# Patient Record
Sex: FEMALE | Race: WHITE
[De-identification: names, ages, dates, MRNs, and addresses within clinical notes are randomized per-mention and may not be internally consistent; named-entity substitution may affect disease eponyms.]

---

## 2018-09-28 ENCOUNTER — HOSPITAL ENCOUNTER (EMERGENCY)
Dept: HOSPITAL 80 - FED | Age: 48
Discharge: HOME | End: 2018-09-28
Payer: MEDICAID

## 2018-09-28 VITALS — DIASTOLIC BLOOD PRESSURE: 77 MMHG | SYSTOLIC BLOOD PRESSURE: 125 MMHG

## 2018-09-28 DIAGNOSIS — I47.1: Primary | ICD-10-CM

## 2018-09-28 DIAGNOSIS — E86.9: ICD-10-CM

## 2018-09-28 LAB — PLATELET # BLD: 416 10^3/UL (ref 150–400)

## 2018-09-28 NOTE — CPEKG
Test Reason : OPEN

Blood Pressure : ***/*** mmHG

Vent. Rate : 091 BPM     Atrial Rate : 092 BPM

   P-R Int : 165 ms          QRS Dur : 074 ms

    QT Int : 351 ms       P-R-T Axes : 041 003 037 degrees

   QTc Int : 432 ms

 

Sinus rhythm

Low voltage, precordial leads

 

Confirmed by Philippe Olguin ([id number]) on 9/28/2018 10:43:25 PM

 

Referred By:             Confirmed By:hPilippe Olguin

## 2018-09-28 NOTE — EDPHY
H & P


Time Seen by Provider: 09/28/18 21:26


HPI/ROS: 





CHIEF COMPLAINT:  palpitations





HISTORY OF PRESENT ILLNESS:  The patient is a 48-year-old female with no 

significant past medical history who began having palpitations at work and 

feeling lightheaded.  Paramedics were called and found her to be in SVT.  They 

gave her 12 mg of adenosine and she converted to sinus rhythm.  She is 

currently asymptomatic.  No chest pain.  No shortness of breath with her 

associated symptoms.  No recent fevers or infections.  She is [place]-speaking 

only.  She denies recent caffeine or stimulant abuse.


Severity:  Severe


Modifying factors:  Resolved with adenosine





REVIEW OF SYSTEMS:


Constitutional:  denies: chills, fever, recent illness, recent injury


EENTM: denies: blurred vision, double vision, nose congestion


Respiratory: denies: cough, shortness of breath


Cardiac:  See HPI denies: chest pain, 


Gastrointestinal/Abdominal: denies: abdominal pain, diarrhea, nausea, vomiting, 

blood streaked stools


Genitourinary: denies: dysuria, frequency, hematuria, pain


Musculoskeletal: denies: joint pain, muscle pain


Skin: denies: lesions, rash, jaundice, bruising


Neurological: denies: headache, numbness, paresthesia, tingling, dizziness, 

weakness


Hematologic/Lymphatic: denies: blood clots, easy bleeding, easy bruising


Immunologic/allergic: denies: HIV/AIDS, transplant


 10 systems reviewed and negative except as noted





EXAM:


GENERAL:  Well-appearing, obese and in no acute distress.


HEAD:  Atraumatic, normocephalic.


EYES:  Pupils equal round and reactive to light, extraocular movements intact, 

sclera anicteric, conjunctiva are normal.


ENT:  TMs normal, nares patent, oropharynx clear without exudates.  Moist 

mucous membranes.


NECK:  Normal range of motion, supple without lymphadenopathy or JVD.


LUNGS:  Breath sounds clear to auscultation bilaterally and equal.  No wheezes 

rales or rhonchi.


HEART:  Regular rate and rhythm without murmurs, rubs or gallops.


ABDOMEN:  Soft, nontender, normoactive bowel sounds.  No guarding, no rebound.  

No masses appreciated. 


BACK:  No CVA tenderness, no spinal tenderness, step-offs or deformities


EXTREMITIES:  Normal range of motion, no pitting or edema.  No clubbing or 

cyanosis.


NEUROLOGICAL:  Cranial nerves II through XII grossly intact.  Normal speech, 

normal gait.  5/5 strength, normal movement in all extremities, normal sensation

, normal reflexes


PSYCH:  Normal mood, normal affect.


SKIN:  Warm, dry, normal turgor, no visible rashes or lesions.








Source: Patient, EMS


Exam Limitations: No limitations





- Medical/Surgical History


Hx Asthma: No


Hx Chronic Respiratory Disease: No


Hx Diabetes: No


Hx Cardiac Disease: No


Hx Renal Disease: No


Hx Cirrhosis: No


Hx Alcoholism: No





- Family History


Significant Family History: No pertinent family hx





- Social History


Smoking Status: Never smoked


Alcohol Use: Sober


Drug Use: None


Constitutional: 


 Initial Vital Signs











Temperature (C)  37 C   09/28/18 21:26


 


Heart Rate  102 H  09/28/18 21:26


 


Respiratory Rate  22 H  09/28/18 21:26


 


Blood Pressure  136/71 H  09/28/18 21:26


 


O2 Sat (%)  98   09/28/18 21:26








 











O2 Delivery Mode               Room Air














Allergies/Adverse Reactions: 


 





No Known Allergies Allergy (Verified 09/28/18 21:36)


 








Home Medications: 














 Medication  Instructions  Recorded


 


NK [No Known Home Meds]  09/28/18














Medical Decision Making





- Diagnostics


EKG Interpretation: 





An EKG obtained and was read and documented in trace view.  Please see trace 

view for full reading and report.  Sinus rhythm, no acute ischemic changes 


ED Course/Re-evaluation: 





Patient remains asymptomatic.  Her lab work is reassuring.  I will discharge 

her at this time to follow up with Cardiology for consideration of prophylactic 

medications.  She understands and agrees with this plan.


Differential Diagnosis: 





Partial list of the Differential diagnosis considered include but were not 

limited to;  SVT, atrial fibrillation and although unlikely based on the 

history and physical exam, I also considered acute coronary disease, infection.

  





- Data Points


Laboratory Results: 


 Laboratory Results





 09/28/18 21:26 





 09/28/18 21:26 





 











  09/28/18 09/28/18





  21:26 21:26


 


WBC    9.34 10^3/uL 10^3/uL





    (3.80-9.50) 


 


RBC    4.82 10^6/uL 10^6/uL





    (4.18-5.33) 


 


Hgb    10.7 g/dL L g/dL





    (12.6-16.3) 


 


Hct    35.1 % L %





    (38.0-47.0) 


 


MCV    72.8 fL L fL





    (81.5-99.8) 


 


MCH    22.2 pg L pg





    (27.9-34.1) 


 


MCHC    30.5 g/dL L g/dL





    (32.4-36.7) 


 


RDW    18.3 % H %





    (11.5-15.2) 


 


Plt Count    416 10^3/uL H 10^3/uL





    (150-400) 


 


MPV    11.5 fL fL





    (8.7-11.7) 


 


Neut % (Auto)    49.5 % %





    (39.3-74.2) 


 


Lymph % (Auto)    39.1 % %





    (15.0-45.0) 


 


Mono % (Auto)    7.9 % %





    (4.5-13.0) 


 


Eos % (Auto)    3.0 % %





    (0.6-7.6) 


 


Baso % (Auto)    0.3 % %





    (0.3-1.7) 


 


Nucleat RBC Rel Count    0.0 % %





    (0.0-0.2) 


 


Absolute Neuts (auto)    4.62 10^3/uL 10^3/uL





    (1.70-6.50) 


 


Absolute Lymphs (auto)    3.65 10^3/uL H 10^3/uL





    (1.00-3.00) 


 


Absolute Monos (auto)    0.74 10^3/uL 10^3/uL





    (0.30-0.80) 


 


Absolute Eos (auto)    0.28 10^3/uL 10^3/uL





    (0.03-0.40) 


 


Absolute Basos (auto)    0.03 10^3/uL 10^3/uL





    (0.02-0.10) 


 


Absolute Nucleated RBC    0.00 10^3/uL 10^3/uL





    (0-0.01) 


 


Immature Gran %    0.2 % %





    (0.0-1.1) 


 


Immature Gran #    0.02 10^3/uL 10^3/uL





    (0.00-0.10) 


 


Sodium  140 mEq/L mEq/L  





   (135-145)  


 


Potassium  4.3 mEq/L mEq/L  





   (3.3-5.0)  


 


Chloride  103 mEq/L mEq/L  





   ()  


 


Carbon Dioxide  26 mEq/l mEq/l  





   (22-31)  


 


Anion Gap  11 mEq/L mEq/L  





   (8-16)  


 


BUN  19 mg/dL mg/dL  





   (7-23)  


 


Creatinine  0.7 mg/dL mg/dL  





   (0.6-1.0)  


 


Estimated GFR  > 60   





   


 


Glucose  101 mg/dL H mg/dL  





   ()  


 


Calcium  9.6 mg/dL mg/dL  





   (8.5-10.4)  


 


Magnesium  2.0 mg/dL mg/dL  





   (1.6-2.3)  


 


TSH  1.870 uIU/mL uIU/mL  





   (0.465-4.680)  











Medications Given: 


 








Discontinued Medications





Sodium Chloride (Ns)  1,000 mls @ 0 mls/hr IV EDNOW ONE; Wide Open


   PRN Reason: Protocol


   Stop: 09/28/18 21:30


   Last Admin: 09/28/18 21:48 Dose:  1,000 mls








Departure





- Departure


Disposition: Home, Routine, Self-Care


Clinical Impression: 


 Supraventricular tachycardia





Condition: Fair


Instructions:  Supraventricular Tachycardia (ED)


Referrals: 


Patient,NotPresent [Unknown] - As per Instructions


Nadine Mckinney MD [Medical Doctor] - As per Instructions


Print Language: [place]